# Patient Record
Sex: FEMALE | Race: WHITE | Employment: FULL TIME | URBAN - METROPOLITAN AREA
[De-identification: names, ages, dates, MRNs, and addresses within clinical notes are randomized per-mention and may not be internally consistent; named-entity substitution may affect disease eponyms.]

---

## 2018-06-03 ENCOUNTER — APPOINTMENT (OUTPATIENT)
Dept: GENERAL RADIOLOGY | Age: 38
End: 2018-06-03
Payer: COMMERCIAL

## 2018-06-03 ENCOUNTER — HOSPITAL ENCOUNTER (EMERGENCY)
Age: 38
Discharge: HOME | End: 2018-06-03
Attending: EMERGENCY MEDICINE
Payer: COMMERCIAL

## 2018-06-03 VITALS
RESPIRATION RATE: 18 BRPM | OXYGEN SATURATION: 98 % | DIASTOLIC BLOOD PRESSURE: 70 MMHG | TEMPERATURE: 97.8 F | HEART RATE: 83 BPM | SYSTOLIC BLOOD PRESSURE: 134 MMHG | WEIGHT: 270 LBS | HEIGHT: 70 IN | BODY MASS INDEX: 38.65 KG/M2

## 2018-06-03 DIAGNOSIS — K52.9 GASTROENTERITIS: Primary | ICD-10-CM

## 2018-06-03 DIAGNOSIS — R10.84 GENERALIZED ABDOMINAL PAIN: ICD-10-CM

## 2018-06-03 LAB
ANION GAP SERPL CALCULATED.3IONS-SCNC: 12 MMOL/L (ref 7–16)
BACTERIA: ABNORMAL /HPF
BILIRUBIN URINE: ABNORMAL
BLOOD, URINE: ABNORMAL
BUN BLDV-MCNC: 8 MG/DL (ref 6–20)
CALCIUM SERPL-MCNC: 9 MG/DL (ref 8.6–10.2)
CHLORIDE BLD-SCNC: 100 MMOL/L (ref 98–107)
CHP ED QC CHECK: NORMAL
CLARITY: CLEAR
CO2: 23 MMOL/L (ref 22–29)
COLOR: YELLOW
CREAT SERPL-MCNC: 0.6 MG/DL (ref 0.5–1)
EPITHELIAL CELLS, UA: ABNORMAL /HPF
GFR AFRICAN AMERICAN: >60
GFR NON-AFRICAN AMERICAN: >60 ML/MIN/1.73
GLUCOSE BLD-MCNC: 114 MG/DL (ref 74–109)
GLUCOSE URINE: NEGATIVE MG/DL
HCT VFR BLD CALC: 42.9 % (ref 34–48)
HEMOGLOBIN: 14.4 G/DL (ref 11.5–15.5)
KETONES, URINE: NEGATIVE MG/DL
LEUKOCYTE ESTERASE, URINE: NEGATIVE
MAGNESIUM: 2.3 MG/DL (ref 1.6–2.6)
MCH RBC QN AUTO: 30.5 PG (ref 26–35)
MCHC RBC AUTO-ENTMCNC: 33.6 % (ref 32–34.5)
MCV RBC AUTO: 90.9 FL (ref 80–99.9)
NITRITE, URINE: NEGATIVE
PDW BLD-RTO: 12.9 FL (ref 11.5–15)
PH UA: 5.5 (ref 5–9)
PLATELET # BLD: 282 E9/L (ref 130–450)
PMV BLD AUTO: 10.4 FL (ref 7–12)
POTASSIUM SERPL-SCNC: 6 MMOL/L (ref 3.5–5)
PREGNANCY TEST URINE, POC: NEGATIVE
PROTEIN UA: ABNORMAL MG/DL
RBC # BLD: 4.72 E12/L (ref 3.5–5.5)
RBC UA: ABNORMAL /HPF (ref 0–2)
SODIUM BLD-SCNC: 135 MMOL/L (ref 132–146)
SPECIFIC GRAVITY UA: 1.01 (ref 1–1.03)
UROBILINOGEN, URINE: 0.2 E.U./DL
WBC # BLD: 4.3 E9/L (ref 4.5–11.5)
WBC UA: ABNORMAL /HPF (ref 0–5)

## 2018-06-03 PROCEDURE — 96374 THER/PROPH/DIAG INJ IV PUSH: CPT

## 2018-06-03 PROCEDURE — 2580000003 HC RX 258: Performed by: EMERGENCY MEDICINE

## 2018-06-03 PROCEDURE — 96375 TX/PRO/DX INJ NEW DRUG ADDON: CPT

## 2018-06-03 PROCEDURE — 85027 COMPLETE CBC AUTOMATED: CPT

## 2018-06-03 PROCEDURE — 87088 URINE BACTERIA CULTURE: CPT

## 2018-06-03 PROCEDURE — 80048 BASIC METABOLIC PNL TOTAL CA: CPT

## 2018-06-03 PROCEDURE — 6360000002 HC RX W HCPCS: Performed by: EMERGENCY MEDICINE

## 2018-06-03 PROCEDURE — 81001 URINALYSIS AUTO W/SCOPE: CPT

## 2018-06-03 PROCEDURE — 6370000000 HC RX 637 (ALT 250 FOR IP): Performed by: EMERGENCY MEDICINE

## 2018-06-03 PROCEDURE — 71046 X-RAY EXAM CHEST 2 VIEWS: CPT

## 2018-06-03 PROCEDURE — 99284 EMERGENCY DEPT VISIT MOD MDM: CPT

## 2018-06-03 PROCEDURE — 83735 ASSAY OF MAGNESIUM: CPT

## 2018-06-03 RX ORDER — ACETAMINOPHEN 325 MG/1
650 TABLET ORAL ONCE
Status: COMPLETED | OUTPATIENT
Start: 2018-06-03 | End: 2018-06-03

## 2018-06-03 RX ORDER — ONDANSETRON 4 MG/1
4 TABLET, FILM COATED ORAL EVERY 8 HOURS PRN
Qty: 12 TABLET | Refills: 0 | Status: SHIPPED | OUTPATIENT
Start: 2018-06-03 | End: 2018-06-08

## 2018-06-03 RX ORDER — DICYCLOMINE HYDROCHLORIDE 10 MG/1
20 CAPSULE ORAL
Qty: 15 CAPSULE | Refills: 0 | Status: SHIPPED | OUTPATIENT
Start: 2018-06-03 | End: 2019-08-25

## 2018-06-03 RX ORDER — FENTANYL CITRATE 50 UG/ML
50 INJECTION, SOLUTION INTRAMUSCULAR; INTRAVENOUS ONCE
Status: COMPLETED | OUTPATIENT
Start: 2018-06-03 | End: 2018-06-03

## 2018-06-03 RX ORDER — 0.9 % SODIUM CHLORIDE 0.9 %
2000 INTRAVENOUS SOLUTION INTRAVENOUS ONCE
Status: COMPLETED | OUTPATIENT
Start: 2018-06-03 | End: 2018-06-03

## 2018-06-03 RX ORDER — ONDANSETRON 2 MG/ML
4 INJECTION INTRAMUSCULAR; INTRAVENOUS ONCE
Status: COMPLETED | OUTPATIENT
Start: 2018-06-03 | End: 2018-06-03

## 2018-06-03 RX ADMIN — SODIUM CHLORIDE 2000 ML: 9 INJECTION, SOLUTION INTRAVENOUS at 14:15

## 2018-06-03 RX ADMIN — ACETAMINOPHEN 650 MG: 325 TABLET ORAL at 15:32

## 2018-06-03 RX ADMIN — FENTANYL CITRATE 50 MCG: 50 INJECTION, SOLUTION INTRAMUSCULAR; INTRAVENOUS at 16:20

## 2018-06-03 RX ADMIN — ONDANSETRON 4 MG: 2 INJECTION INTRAMUSCULAR; INTRAVENOUS at 14:15

## 2018-06-03 ASSESSMENT — PAIN DESCRIPTION - PAIN TYPE
TYPE: ACUTE PAIN

## 2018-06-03 ASSESSMENT — PAIN SCALES - GENERAL
PAINLEVEL_OUTOF10: 2
PAINLEVEL_OUTOF10: 8
PAINLEVEL_OUTOF10: 5
PAINLEVEL_OUTOF10: 8
PAINLEVEL_OUTOF10: 8

## 2018-06-03 ASSESSMENT — PAIN DESCRIPTION - DESCRIPTORS
DESCRIPTORS: ACHING;DISCOMFORT
DESCRIPTORS: ACHING
DESCRIPTORS: DISCOMFORT

## 2018-06-03 ASSESSMENT — PAIN SCALES - WONG BAKER
WONGBAKER_NUMERICALRESPONSE: 2
WONGBAKER_NUMERICALRESPONSE: 2
WONGBAKER_NUMERICALRESPONSE: 8

## 2018-06-03 ASSESSMENT — PAIN DESCRIPTION - ORIENTATION: ORIENTATION: RIGHT

## 2018-06-03 ASSESSMENT — PAIN DESCRIPTION - LOCATION
LOCATION: ABDOMEN
LOCATION: SHOULDER
LOCATION: ABDOMEN

## 2018-06-05 LAB — URINE CULTURE, ROUTINE: NORMAL

## 2019-08-25 ENCOUNTER — HOSPITAL ENCOUNTER (EMERGENCY)
Age: 39
Discharge: HOME | End: 2019-08-25
Attending: EMERGENCY MEDICINE
Payer: COMMERCIAL

## 2019-08-25 ENCOUNTER — APPOINTMENT (OUTPATIENT)
Dept: ULTRASOUND IMAGING | Age: 39
End: 2019-08-25
Payer: COMMERCIAL

## 2019-08-25 VITALS
HEIGHT: 70 IN | SYSTOLIC BLOOD PRESSURE: 133 MMHG | WEIGHT: 260 LBS | TEMPERATURE: 98.3 F | OXYGEN SATURATION: 98 % | DIASTOLIC BLOOD PRESSURE: 77 MMHG | BODY MASS INDEX: 37.22 KG/M2 | HEART RATE: 80 BPM | RESPIRATION RATE: 16 BRPM

## 2019-08-25 DIAGNOSIS — K82.8 GALLBLADDER SLUDGE: ICD-10-CM

## 2019-08-25 DIAGNOSIS — R10.13 EPIGASTRIC PAIN: Primary | ICD-10-CM

## 2019-08-25 LAB
ALBUMIN SERPL-MCNC: 4.1 G/DL (ref 3.5–5.2)
ALP BLD-CCNC: 97 U/L (ref 35–104)
ALT SERPL-CCNC: 9 U/L (ref 0–32)
ANION GAP SERPL CALCULATED.3IONS-SCNC: 13 MMOL/L (ref 7–16)
AST SERPL-CCNC: 14 U/L (ref 0–31)
BACTERIA: ABNORMAL /HPF
BASOPHILS ABSOLUTE: 0.02 E9/L (ref 0–0.2)
BASOPHILS RELATIVE PERCENT: 0.3 % (ref 0–2)
BILIRUB SERPL-MCNC: <0.2 MG/DL (ref 0–1.2)
BILIRUBIN DIRECT: <0.2 MG/DL (ref 0–0.3)
BILIRUBIN URINE: NEGATIVE
BILIRUBIN, INDIRECT: NORMAL MG/DL (ref 0–1)
BLOOD, URINE: ABNORMAL
BUN BLDV-MCNC: 8 MG/DL (ref 6–20)
CALCIUM SERPL-MCNC: 9.1 MG/DL (ref 8.6–10.2)
CHLORIDE BLD-SCNC: 102 MMOL/L (ref 98–107)
CLARITY: CLEAR
CO2: 26 MMOL/L (ref 22–29)
COLOR: YELLOW
CREAT SERPL-MCNC: 0.6 MG/DL (ref 0.5–1)
EOSINOPHILS ABSOLUTE: 0.05 E9/L (ref 0.05–0.5)
EOSINOPHILS RELATIVE PERCENT: 0.8 % (ref 0–6)
EPITHELIAL CELLS, UA: ABNORMAL /HPF
GFR AFRICAN AMERICAN: >60
GFR NON-AFRICAN AMERICAN: >60 ML/MIN/1.73
GLUCOSE BLD-MCNC: 106 MG/DL (ref 74–99)
GLUCOSE URINE: NEGATIVE MG/DL
HCG, URINE, POC: NEGATIVE
HCT VFR BLD CALC: 42.7 % (ref 34–48)
HEMOGLOBIN: 14 G/DL (ref 11.5–15.5)
IMMATURE GRANULOCYTES #: 0.02 E9/L
IMMATURE GRANULOCYTES %: 0.3 % (ref 0–5)
KETONES, URINE: NEGATIVE MG/DL
LACTIC ACID: 0.8 MMOL/L (ref 0.5–2.2)
LEUKOCYTE ESTERASE, URINE: NEGATIVE
LIPASE: 14 U/L (ref 13–60)
LYMPHOCYTES ABSOLUTE: 1.18 E9/L (ref 1.5–4)
LYMPHOCYTES RELATIVE PERCENT: 18.3 % (ref 20–42)
Lab: NORMAL
MCH RBC QN AUTO: 30.2 PG (ref 26–35)
MCHC RBC AUTO-ENTMCNC: 32.8 % (ref 32–34.5)
MCV RBC AUTO: 92.2 FL (ref 80–99.9)
MONOCYTES ABSOLUTE: 0.59 E9/L (ref 0.1–0.95)
MONOCYTES RELATIVE PERCENT: 9.1 % (ref 2–12)
NEGATIVE QC PASS/FAIL: NORMAL
NEUTROPHILS ABSOLUTE: 4.59 E9/L (ref 1.8–7.3)
NEUTROPHILS RELATIVE PERCENT: 71.2 % (ref 43–80)
NITRITE, URINE: NEGATIVE
PDW BLD-RTO: 13.3 FL (ref 11.5–15)
PH UA: 6 (ref 5–9)
PLATELET # BLD: 296 E9/L (ref 130–450)
PMV BLD AUTO: 10.4 FL (ref 7–12)
POSITIVE QC PASS/FAIL: NORMAL
POTASSIUM REFLEX MAGNESIUM: 4 MMOL/L (ref 3.5–5)
PROTEIN UA: NEGATIVE MG/DL
RBC # BLD: 4.63 E12/L (ref 3.5–5.5)
RBC UA: ABNORMAL /HPF (ref 0–2)
SODIUM BLD-SCNC: 141 MMOL/L (ref 132–146)
SPECIFIC GRAVITY UA: 1.01 (ref 1–1.03)
TOTAL PROTEIN: 7.8 G/DL (ref 6.4–8.3)
UROBILINOGEN, URINE: 0.2 E.U./DL
WBC # BLD: 6.5 E9/L (ref 4.5–11.5)
WBC UA: ABNORMAL /HPF (ref 0–5)

## 2019-08-25 PROCEDURE — 96374 THER/PROPH/DIAG INJ IV PUSH: CPT

## 2019-08-25 PROCEDURE — 6360000002 HC RX W HCPCS: Performed by: EMERGENCY MEDICINE

## 2019-08-25 PROCEDURE — 96361 HYDRATE IV INFUSION ADD-ON: CPT

## 2019-08-25 PROCEDURE — 83690 ASSAY OF LIPASE: CPT

## 2019-08-25 PROCEDURE — 6370000000 HC RX 637 (ALT 250 FOR IP): Performed by: EMERGENCY MEDICINE

## 2019-08-25 PROCEDURE — 76705 ECHO EXAM OF ABDOMEN: CPT

## 2019-08-25 PROCEDURE — 96375 TX/PRO/DX INJ NEW DRUG ADDON: CPT

## 2019-08-25 PROCEDURE — 85025 COMPLETE CBC W/AUTO DIFF WBC: CPT

## 2019-08-25 PROCEDURE — 99284 EMERGENCY DEPT VISIT MOD MDM: CPT

## 2019-08-25 PROCEDURE — 2580000003 HC RX 258: Performed by: EMERGENCY MEDICINE

## 2019-08-25 PROCEDURE — 2500000003 HC RX 250 WO HCPCS: Performed by: EMERGENCY MEDICINE

## 2019-08-25 PROCEDURE — 80048 BASIC METABOLIC PNL TOTAL CA: CPT

## 2019-08-25 PROCEDURE — 83605 ASSAY OF LACTIC ACID: CPT

## 2019-08-25 PROCEDURE — 80076 HEPATIC FUNCTION PANEL: CPT

## 2019-08-25 PROCEDURE — 81001 URINALYSIS AUTO W/SCOPE: CPT

## 2019-08-25 RX ORDER — OMEPRAZOLE 20 MG/1
1 CAPSULE, DELAYED RELEASE ORAL DAILY
Refills: 4 | COMMUNITY
Start: 2019-08-07 | End: 2019-08-25

## 2019-08-25 RX ORDER — SUCRALFATE 1 G/1
1 TABLET ORAL 4 TIMES DAILY
Qty: 120 TABLET | Refills: 3 | Status: SHIPPED | OUTPATIENT
Start: 2019-08-25 | End: 2020-02-20

## 2019-08-25 RX ORDER — ONDANSETRON 4 MG/1
4 TABLET, ORALLY DISINTEGRATING ORAL EVERY 8 HOURS PRN
Qty: 24 TABLET | Refills: 0 | Status: SHIPPED | OUTPATIENT
Start: 2019-08-25 | End: 2021-05-24

## 2019-08-25 RX ORDER — ONDANSETRON 2 MG/ML
4 INJECTION INTRAMUSCULAR; INTRAVENOUS ONCE
Status: COMPLETED | OUTPATIENT
Start: 2019-08-25 | End: 2019-08-25

## 2019-08-25 RX ORDER — DULOXETIN HYDROCHLORIDE 60 MG/1
1 CAPSULE, DELAYED RELEASE ORAL DAILY
Refills: 4 | COMMUNITY
Start: 2019-08-07

## 2019-08-25 RX ORDER — RIZATRIPTAN BENZOATE 10 MG/1
10 TABLET ORAL PRN
COMMUNITY
End: 2020-02-20

## 2019-08-25 RX ORDER — PANTOPRAZOLE SODIUM 40 MG/1
40 TABLET, DELAYED RELEASE ORAL DAILY
Qty: 30 TABLET | Refills: 1 | Status: SHIPPED | OUTPATIENT
Start: 2019-08-25 | End: 2020-02-20

## 2019-08-25 RX ORDER — 0.9 % SODIUM CHLORIDE 0.9 %
1000 INTRAVENOUS SOLUTION INTRAVENOUS ONCE
Status: COMPLETED | OUTPATIENT
Start: 2019-08-25 | End: 2019-08-25

## 2019-08-25 RX ADMIN — ONDANSETRON 4 MG: 2 INJECTION INTRAMUSCULAR; INTRAVENOUS at 07:05

## 2019-08-25 RX ADMIN — SODIUM CHLORIDE 1000 ML: 9 INJECTION, SOLUTION INTRAVENOUS at 07:05

## 2019-08-25 RX ADMIN — LIDOCAINE HYDROCHLORIDE: 20 SOLUTION ORAL; TOPICAL at 07:05

## 2019-08-25 RX ADMIN — FAMOTIDINE 20 MG: 10 INJECTION, SOLUTION INTRAVENOUS at 07:05

## 2019-08-25 ASSESSMENT — ENCOUNTER SYMPTOMS
WHEEZING: 0
CONSTIPATION: 0
RHINORRHEA: 0
COUGH: 0
HEMATEMESIS: 0
EYE PAIN: 0
DIARRHEA: 1
ABDOMINAL PAIN: 1
BACK PAIN: 0
SINUS PRESSURE: 0
BLOOD IN STOOL: 0
EYE DISCHARGE: 0
EYE REDNESS: 0
SORE THROAT: 0
SHORTNESS OF BREATH: 0
HEMATOCHEZIA: 0
ABDOMINAL DISTENTION: 0
VOMITING: 0
NAUSEA: 0

## 2019-08-25 ASSESSMENT — PAIN DESCRIPTION - LOCATION: LOCATION: ABDOMEN

## 2019-08-25 ASSESSMENT — PAIN SCALES - GENERAL: PAINLEVEL_OUTOF10: 8

## 2019-08-25 ASSESSMENT — PAIN DESCRIPTION - PAIN TYPE: TYPE: ACUTE PAIN

## 2019-08-25 NOTE — ED PROVIDER NOTES
dysuria, flank pain, frequency, hematuria, vaginal bleeding and vaginal discharge. Musculoskeletal: Negative for arthralgias, back pain, neck pain and neck stiffness. Skin: Negative for rash and wound. Neurological: Negative for syncope, weakness, light-headedness and headaches. Hematological: Negative for adenopathy. All other systems reviewed and are negative. Physical Exam   Constitutional: She is oriented to person, place, and time. She appears well-developed and well-nourished. HENT:   Head: Normocephalic and atraumatic. Head is without raccoon's eyes and without Estes's sign. Nose: Nose normal.   Mouth/Throat: Uvula is midline, oropharynx is clear and moist and mucous membranes are normal.   Eyes: Pupils are equal, round, and reactive to light. Conjunctivae, EOM and lids are normal.   Neck: Trachea normal and normal range of motion. Neck supple. No JVD present. Cardiovascular: Normal rate and regular rhythm. Pulmonary/Chest: Effort normal and breath sounds normal. No respiratory distress. She has no wheezes. She has no rales. Abdominal: Soft. Bowel sounds are normal. There is tenderness in the epigastric area. There is no rebound, no guarding and no CVA tenderness. Musculoskeletal: She exhibits no edema. Neurological: She is alert and oriented to person, place, and time. She has normal strength. No cranial nerve deficit or sensory deficit. Skin: Skin is warm and dry. Nursing note and vitals reviewed. Procedures     MDM  Number of Diagnoses or Management Options  Diagnosis management comments: Patient has epigastric pain and tenderness to palpation. Her symptoms improved in ED with IV fluids and medications. Her RUQ US is remarkable for gallbladder sludge. She is discharged in stable condition with instructions to follow-up with her PCP or return to ED if her symptoms worsen.                       --------------------------------------------- PAST HISTORY mg/dL    GFR Non-African American >60 >=60 mL/min/1.73    GFR African American >60     Calcium 9.1 8.6 - 10.2 mg/dL   Hepatic Function Panel   Result Value Ref Range    Total Protein 7.8 6.4 - 8.3 g/dL    Alb 4.1 3.5 - 5.2 g/dL    Alkaline Phosphatase 97 35 - 104 U/L    ALT 9 0 - 32 U/L    AST 14 0 - 31 U/L    Total Bilirubin <0.2 0.0 - 1.2 mg/dL    Bilirubin, Direct <0.2 0.0 - 0.3 mg/dL    Bilirubin, Indirect see below 0.0 - 1.0 mg/dL   Lipase   Result Value Ref Range    Lipase 14 13 - 60 U/L   Lactic Acid, Plasma   Result Value Ref Range    Lactic Acid 0.8 0.5 - 2.2 mmol/L   Urinalysis, reflex to microscopic   Result Value Ref Range    Color, UA Yellow Straw/Yellow    Clarity, UA Clear Clear    Glucose, Ur Negative Negative mg/dL    Bilirubin Urine Negative Negative    Ketones, Urine Negative Negative mg/dL    Specific Gravity, UA 1.010 1.005 - 1.030    Blood, Urine MODERATE (A) Negative    pH, UA 6.0 5.0 - 9.0    Protein, UA Negative Negative mg/dL    Urobilinogen, Urine 0.2 <2.0 E.U./dL    Nitrite, Urine Negative Negative    Leukocyte Esterase, Urine Negative Negative   Microscopic Urinalysis   Result Value Ref Range    WBC, UA 0-1 0 - 5 /HPF    RBC, UA 0-1 0 - 2 /HPF    Epi Cells FEW /HPF    Bacteria, UA FEW (A) /HPF   POC Pregnancy Urine   Result Value Ref Range    HCG, Urine, POC Negative Negative    Lot Number [id number]     Positive QC Pass/Fail Pass     Negative QC Pass/Fail Pass        Radiology:  US GALLBLADDER RUQ   Final Result   Gallbladder sludging without discrete cholelithiasis.          ------------------------- NURSING NOTES AND VITALS REVIEWED ---------------------------  Date / Time Roomed:  8/25/2019  6:31 AM  ED Bed Assignment:  17/17    The nursing notes within the ED encounter and vital signs as below have been reviewed.    /77   Pulse 80   Temp 98.3 °F (36.8 °C) (Oral)   Resp 16   Ht 5' 10\" (1.778 m)   Wt 260 lb (117.9 kg)   LMP 08/18/2019   SpO2 98%   BMI 37.31 kg/m²   Oxygen Saturation Interpretation: Normal      ------------------------------------------ PROGRESS NOTES ------------------------------------------  8:10 AM  Patient is not in distress. Her symptoms have improved. 9:31 AM  I have spoken with the patient and discussed todays results, in addition to providing specific details for the plan of care and counseling regarding the diagnosis and prognosis. Their questions are answered at this time and they are agreeable with the plan. I discussed at length with them reasons for immediate return here for re evaluation. They will followup with their primary care physician by calling their office tomorrow. --------------------------------- ADDITIONAL PROVIDER NOTES ---------------------------------  At this time the patient is without objective evidence of an acute process requiring hospitalization or inpatient management. They have remained hemodynamically stable throughout their entire ED visit and are stable for discharge with outpatient follow-up. The plan has been discussed in detail and they are aware of the specific conditions for emergent return, as well as the importance of follow-up. New Prescriptions    ONDANSETRON (ZOFRAN ODT) 4 MG DISINTEGRATING TABLET    Take 1 tablet by mouth every 8 hours as needed for Nausea or Vomiting    PANTOPRAZOLE (PROTONIX) 40 MG TABLET    Take 1 tablet by mouth daily    SUCRALFATE (CARAFATE) 1 GM TABLET    Take 1 tablet by mouth 4 times daily       Diagnosis:  1. Epigastric pain    2. Gallbladder sludge        Disposition:  Patient's disposition: Discharge to home  Patient's condition is stable.          Jay Torres,   Resident  08/25/19 1008

## 2019-10-29 ENCOUNTER — APPOINTMENT (OUTPATIENT)
Dept: GENERAL RADIOLOGY | Age: 39
End: 2019-10-29
Payer: COMMERCIAL

## 2019-10-29 ENCOUNTER — HOSPITAL ENCOUNTER (EMERGENCY)
Age: 39
Discharge: HOME | End: 2019-10-29
Payer: COMMERCIAL

## 2019-10-29 VITALS
RESPIRATION RATE: 16 BRPM | TEMPERATURE: 98.7 F | OXYGEN SATURATION: 98 % | DIASTOLIC BLOOD PRESSURE: 91 MMHG | SYSTOLIC BLOOD PRESSURE: 161 MMHG | HEART RATE: 86 BPM

## 2019-10-29 DIAGNOSIS — S63.92XA HAND SPRAIN, LEFT, INITIAL ENCOUNTER: Primary | ICD-10-CM

## 2019-10-29 DIAGNOSIS — S80.02XA CONTUSION OF LEFT KNEE, INITIAL ENCOUNTER: ICD-10-CM

## 2019-10-29 PROCEDURE — 73562 X-RAY EXAM OF KNEE 3: CPT

## 2019-10-29 PROCEDURE — 73130 X-RAY EXAM OF HAND: CPT

## 2019-10-29 PROCEDURE — 99284 EMERGENCY DEPT VISIT MOD MDM: CPT

## 2019-10-29 RX ORDER — GINSENG 100 MG
CAPSULE ORAL ONCE
Status: DISCONTINUED | OUTPATIENT
Start: 2019-10-29 | End: 2019-10-29 | Stop reason: HOSPADM

## 2019-10-29 ASSESSMENT — ENCOUNTER SYMPTOMS
COLOR CHANGE: 0
COUGH: 0
SHORTNESS OF BREATH: 0
BACK PAIN: 0
CHEST TIGHTNESS: 0

## 2019-10-29 ASSESSMENT — PAIN DESCRIPTION - LOCATION: LOCATION: HAND;KNEE

## 2019-10-29 ASSESSMENT — PAIN DESCRIPTION - PAIN TYPE: TYPE: ACUTE PAIN

## 2019-10-29 ASSESSMENT — PAIN DESCRIPTION - ORIENTATION: ORIENTATION: LEFT

## 2019-10-29 ASSESSMENT — PAIN SCALES - GENERAL: PAINLEVEL_OUTOF10: 7

## 2021-05-28 ENCOUNTER — HOSPITAL ENCOUNTER (OUTPATIENT)
Dept: GENERAL RADIOLOGY | Age: 41
Discharge: HOME | End: 2021-05-30
Payer: COMMERCIAL

## 2021-05-28 DIAGNOSIS — N63.25 BREAST LUMP ON LEFT SIDE AT 9 O'CLOCK POSITION: ICD-10-CM

## 2021-05-28 PROCEDURE — G0279 TOMOSYNTHESIS, MAMMO: HCPCS

## 2021-05-28 PROCEDURE — 76642 ULTRASOUND BREAST LIMITED: CPT

## 2021-09-09 DIAGNOSIS — N63.0 BREAST LUMP: Primary | ICD-10-CM

## 2021-09-15 NOTE — PROGRESS NOTES
Subjective:      Patient ID: Shabana Laughlin is a 46 y.o. female. HPI  History and Physical    Patient's Name/Date of Birth: Shabana Laughlin / 1980    Date: 2021    Shabana Laughlin presents for evaluation of a Left breast lesion and evaluation of high risk status. PCP: Vitaliy Oliva MD. Daya Feliciano. The lesion is located in the 9 o'clock position of the Left breast. The lesion was discovered clinical exam.  The patient has noted a subtle change in BSE. She will have occasional nipple crusting bilaterally, most notable on the left, since breastfeeding her child 4 years ago. -Paternal grandmother with breast cancer age 54 and ovarian cancer;  age 61  -Paternal grandmother with colon cancer at age 39  -Maternal grandfather with colon cancer age 48    Ashkenazi Gnosticism Ancestry: No.    OBSTETRIC RELATED HISTORY:  Age of menarche was 8. Patient is . Age of first live birth was 34. Patient did breast feed. Is patient interested in fertility information about fertility preservation? No    CANCER SURVEILLANCE HISTORY:  Mammograms: Yes   Breast MRI's: No   Breast Biopsies: No   Colonoscopy: No   GI Polyps: No   EGD: No   Pelvic Exam: Yes   Pap Smear: Yes   Dermatology: No   Lung screening: no    Estimated body mass index is 36.01 kg/m² as calculated from the following:    Height as of 21: 5' 10\" (1.778 m). Weight as of 21: 251 lb (113.9 kg). Bra Size: 40 C    Because violence is so common, we ask all our patients: are you in a relationship or do you live with a person who threatens, hurts, or controls you:  Denies    Patient drinks significant caffeinated beverages. Patient does not smoke cigarettes. Patient does not use recreational drugs.       Past Medical History:   Diagnosis Date    Endometriosis        Past Surgical History:   Procedure Laterality Date     SECTION      ENDOMETRIAL ABLATION  10/2015    THROAT SURGERY      in fourth grade       Current Outpatient Medications   Medication Sig Dispense Refill    etonogestrel-ethinyl estradiol (NUVARING) 0.12-0.015 MG/24HR vaginal ring Place 1 each vaginally every 21 days Insert one (1) ring vaginally and leave in place for three (3) weeks, then remove for one (1) week. 1 each 12    omeprazole (PRILOSEC) 10 MG delayed release capsule Take 10 mg by mouth daily      clotrimazole-betamethasone (LOTRISONE) 1-0.05 % cream Apply topically 2 times daily. (Patient not taking: Reported on 9/8/2021) 45 g 2    DULoxetine (CYMBALTA) 60 MG extended release capsule Take 1 capsule by mouth daily  4     No current facility-administered medications for this visit. No Known Allergies    Family History   Problem Relation Age of Onset    Other Mother         IBS    High Blood Pressure Father     Other Maternal Grandmother         Non Hodgkins lymphoma    Colon Cancer Maternal Grandfather     Breast Cancer Paternal Grandmother     Colon Cancer Paternal Grandmother     Uterine Cancer Paternal Grandmother     Ovarian Cancer Paternal Grandmother        Social History     Socioeconomic History    Marital status:      Spouse name: Not on file    Number of children: Not on file    Years of education: Not on file    Highest education level: Not on file   Occupational History    Not on file   Tobacco Use    Smoking status: Never Smoker    Smokeless tobacco: Never Used   Substance and Sexual Activity    Alcohol use: No    Drug use: No    Sexual activity: Not Currently     Partners: Male   Other Topics Concern    Not on file   Social History Narrative    Not on file     Social Determinants of Health     Financial Resource Strain:     Difficulty of Paying Living Expenses:    Food Insecurity:     Worried About Running Out of Food in the Last Year:     Ran Out of Food in the Last Year:    Transportation Needs:     Lack of Transportation (Medical):      Lack of Transportation (Non-Medical): Physical Activity:     Days of Exercise per Week:     Minutes of Exercise per Session:    Stress:     Feeling of Stress :    Social Connections:     Frequency of Communication with Friends and Family:     Frequency of Social Gatherings with Friends and Family:     Attends Christian Services:     Active Member of Clubs or Organizations:     Attends Club or Organization Meetings:     Marital Status:    Intimate Partner Violence:     Fear of Current or Ex-Partner:     Emotionally Abused:     Physically Abused:     Sexually Abused:        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 Review of Systems   Constitutional: Negative for activity change, appetite change, chills, fatigue, fever and unexpected weight change. She reports she generally feels well. HENT: Negative for congestion, postnasal drip, rhinorrhea, sinus pressure, sinus pain, sore throat, trouble swallowing and voice change. Eyes: Negative for discharge, itching and visual disturbance. Respiratory: Negative for cough, choking, chest tightness, shortness of breath and wheezing. Cardiovascular: Negative for chest pain, palpitations and leg swelling. Gastrointestinal: Negative for abdominal distention, abdominal pain, blood in stool, constipation, diarrhea, nausea and vomiting. Endocrine: Negative for cold intolerance and heat intolerance. Genitourinary: Negative for difficulty urinating, dysuria, frequency and hematuria. Musculoskeletal: Negative for arthralgias, back pain, gait problem, joint swelling, myalgias, neck pain and neck stiffness. Allergic/Immunologic: Negative for environmental allergies and food allergies. Neurological: Negative for dizziness, seizures, syncope, speech difficulty, weakness, light-headedness and headaches. Hematological: Negative for adenopathy. Does not bruise/bleed easily. Psychiatric/Behavioral: Negative for agitation, confusion and decreased concentration.  The patient is not nervous/anxious. Objective:   Physical Exam  Vitals and nursing note reviewed. Constitutional:       General: She is not in acute distress. Appearance: She is well-developed. She is not diaphoretic. Comments: ECOG 0; Pleasant and conversant. Busy with her son who is 3years old and 15year old daughter. HENT:      Head: Normocephalic and atraumatic. Mouth/Throat:      Pharynx: No oropharyngeal exudate. Eyes:      General: No scleral icterus. Right eye: No discharge. Left eye: No discharge. Conjunctiva/sclera: Conjunctivae normal.   Neck:      Thyroid: No thyromegaly. Vascular: No JVD. Trachea: No tracheal deviation. Cardiovascular:      Rate and Rhythm: Normal rate and regular rhythm. Heart sounds: No murmur heard. No friction rub. No gallop. Pulmonary:      Effort: Pulmonary effort is normal. No respiratory distress or retractions. Breath sounds: Normal breath sounds. No stridor. No wheezing or rales. Chest:      Chest wall: No mass, lacerations, deformity, swelling, tenderness or edema. Breasts: Breasts are asymmetrical (Left breast slightly smaller and higher sitting on the chest wall compared to right.). Right: No inverted nipple, mass, nipple discharge, skin change or tenderness. Left: No inverted nipple, mass, nipple discharge, skin change or tenderness. Comments: Breast tissue is dense bilaterally, left greater than right. There are no clinically suspicious findings; no skin changes. And no evidence of malignancy. No nipple discharge. Abdominal:      General: There is no distension. Palpations: Abdomen is soft. Tenderness: There is no abdominal tenderness. There is no guarding or rebound. Musculoskeletal:         General: No tenderness or deformity. Normal range of motion.       Right shoulder: Normal.      Left shoulder: Normal.      Cervical back: Normal range of motion and neck supple. Lymphadenopathy:      Cervical: No cervical adenopathy. Right cervical: No superficial, deep or posterior cervical adenopathy. Left cervical: No superficial, deep or posterior cervical adenopathy. Upper Body:      Right upper body: No pectoral adenopathy. Left upper body: No pectoral adenopathy. Skin:     General: Skin is warm and dry. Coloration: Skin is not pale. Findings: No erythema or rash. Neurological:      Mental Status: She is alert and oriented to person, place, and time. Coordination: Coordination normal.   Psychiatric:         Behavior: Behavior normal.         Thought Content: Thought content normal.         Judgment: Judgment normal.         Assessment:      46 y.o. woman who risks for breast cancer include gender and family history including:    -Paternal grandmother with breast cancer age and ovarian cancer age 54;  age 61  -Paternal grandmother with colon cancer at age 39  -Maternal grandfather with colon cancer age 48    She presents with concerns for a left breast mass.    -2021: B/L DIAGNOSTIC MAMMOGRAM AND LEFT BREAST ULTRASOUND: Negative, BI-RADS   -2021: LEFT DIAGNOSTIC MAMMOGRAM AND ULTRASOUND:[facility] negative, BI-RADS 1. During pt's visit today, a Tyrer-Cuzick Risk Evaluation was performed. Pt has a 23.1 % lifetime risk (to age 80) of developing breast cancer. Clinically, her breast exam is unremarkable aside from age-appropriate breast density, left greater than right. We reviewed her imaging today, including her BI-RADS result. We discussed that I cannot guarantee that she does not have breast cancer now; nor can I guarantee that she won't develop breast cancer in the future. However, there were no concerning findings identified on this visit. We reviewed healthy lifestyle, avoiding alcohol, and BSE in detail. HIGH RISK PROTOCOL:      During pt's visit today, a Tyrer-Cuzick Risk Evaluation was performed. Pt has a 21.3 % lifetime risk (to age 80) of developing breast cancer (high risk). Per NCCN guidelines Version 1.2019 , the following recommendations were discussed:      Clinical encounter every 6-12 months   [facility]erral to [facility].  Annual screening mammogram    Annual breast MRI: We discussed that annual MRI screening is to begin 10 years prior to youngest family member diagnosed with breast cancer. However, in view of her family history will plan to get a baseline MRI and November. Further recommendations will be made once genetic testing is complete.    -High risk screening:  Discussed MRI of breast and advised her that breast MRI has high sensitivity but the specificity is lower due to the overlap in the enhancement pattern of benign and malignant lesions. This may lead to false positive results and invasive testing and procedures that may be unnecessary (i.e., biopsy, excision, etc.). We reviewed potential risks of Gadolinium and that risks, if any, are not fully understood. Gadolinium is retained for months or years and brain, bone, and other organs. The FDA states there is no clinical evidence that directly links gadolinium retention to adverse effects in patients with normal kidney function. Nephrogenic systemic fibrosis has occurred in patients with impaired elimination of gadolinium based contrast agents. Higher than recommended dosing or repeated dosing appears to increase the risk. We reviewed that it's important to have blood work to ensure adequate kidney function prior to MRI. We discussed prior authorization does not cover the co-pay, and once authorized, she should contact her insurance company to clearly understand the financial impact of testing. Anaphylactic and other hypersensitivity reactions with cardiovascular, respiratory or cutaneous manifestations, ranging from mild to severe, including death, have occurred.   She verbalizes understanding and agrees to

## 2021-09-17 ENCOUNTER — HOSPITAL ENCOUNTER (OUTPATIENT)
Dept: GENERAL RADIOLOGY | Age: 41
Discharge: HOME | End: 2021-09-19
Payer: COMMERCIAL

## 2021-09-17 ENCOUNTER — OFFICE VISIT (OUTPATIENT)
Dept: BREAST CENTER | Age: 41
End: 2021-09-17
Payer: COMMERCIAL

## 2021-09-17 VITALS
BODY MASS INDEX: 36.36 KG/M2 | HEART RATE: 81 BPM | TEMPERATURE: 98.2 F | OXYGEN SATURATION: 98 % | SYSTOLIC BLOOD PRESSURE: 136 MMHG | HEIGHT: 70 IN | WEIGHT: 254 LBS | DIASTOLIC BLOOD PRESSURE: 80 MMHG | RESPIRATION RATE: 18 BRPM

## 2021-09-17 DIAGNOSIS — Z80.3 FAMILY HISTORY OF BREAST CANCER: Primary | ICD-10-CM

## 2021-09-17 DIAGNOSIS — Z01.818 PRE-OP EXAM: ICD-10-CM

## 2021-09-17 DIAGNOSIS — Z80.0 FAMILY HISTORY OF COLON CANCER: ICD-10-CM

## 2021-09-17 DIAGNOSIS — N63.0 BREAST LUMP: ICD-10-CM

## 2021-09-17 DIAGNOSIS — Z80.41 FAMILY HISTORY OF OVARIAN CANCER: ICD-10-CM

## 2021-09-17 DIAGNOSIS — Z80.0 FAMILY HISTORY OF MALIGNANT NEOPLASM OF COLON IN RELATIVE DIAGNOSED WHEN YOUNGER THAN 50 YEARS OF AGE: ICD-10-CM

## 2021-09-17 DIAGNOSIS — Z91.89 AT HIGH RISK FOR BREAST CANCER: ICD-10-CM

## 2021-09-17 PROCEDURE — 99204 OFFICE O/P NEW MOD 45 MIN: CPT | Performed by: NURSE PRACTITIONER

## 2021-09-17 PROCEDURE — 99203 OFFICE O/P NEW LOW 30 MIN: CPT | Performed by: NURSE PRACTITIONER

## 2021-09-17 PROCEDURE — 76642 ULTRASOUND BREAST LIMITED: CPT

## 2021-09-17 PROCEDURE — 77065 DX MAMMO INCL CAD UNI: CPT

## 2021-09-17 ASSESSMENT — ENCOUNTER SYMPTOMS
NAUSEA: 0
TROUBLE SWALLOWING: 0
SORE THROAT: 0
VOMITING: 0
BLOOD IN STOOL: 0
RHINORRHEA: 0
EYE DISCHARGE: 0
VOICE CHANGE: 0
ABDOMINAL PAIN: 0
SINUS PAIN: 0
WHEEZING: 0
ABDOMINAL DISTENTION: 0
CHOKING: 0
DIARRHEA: 0
CONSTIPATION: 0
COUGH: 0
CHEST TIGHTNESS: 0
BACK PAIN: 0
SINUS PRESSURE: 0
SHORTNESS OF BREATH: 0
EYE ITCHING: 0

## 2021-09-17 NOTE — PATIENT INSTRUCTIONS

## 2021-09-17 NOTE — LETTER
[address] [facility] [address] [address]  Phone: [phone]  Fax: 15 Wendiemiranda WrayPAOLA CNP      2021     Patient: Andrea De Santiago   MR Number: [id number]   YOB: 1980   Date of Visit: 2021        Dear Dr. Lisa Herrera:    Thank you for referring Andrea De Santiago to to the office of Dr. Marissa White, Dr. Ayesha Rodriguez, and Nurse Practitioner Leanne House. Below are the relevant portions of my assessment and plan of care. Flaquito Quevedo is a 46 y.o. extremely pleasant woman who risks for breast cancer include gender and family history including:    -Paternal grandmother with breast cancer age and ovarian cancer age 54;  age 61  -Paternal grandmother with colon cancer at age 39  -Maternal grandfather with colon cancer age 48    She presents with concerns for a left breast mass.    -2021: B/L DIAGNOSTIC MAMMOGRAM AND LEFT BREAST ULTRASOUND: Negative, BI-RADS   -2021: LEFT DIAGNOSTIC MAMMOGRAM AND ULTRASOUND:[facility] negative, BI-RADS 1. Clinically, her breast exam is unremarkable aside from age-appropriate breast density, left greater than right. We reviewed her imaging today, including her BI-RADS result. We discussed that I cannot guarantee that she does not have breast cancer now; nor can I guarantee that she won't develop breast cancer in the future. However, there were no concerning findings identified on this visit. We reviewed healthy lifestyle, avoiding alcohol, and BSE in detail. HIGH RISK PROTOCOL:      During pt's visit today, a Tyrer-Cuzick Risk Evaluation was performed. Pt has a 23.1 % lifetime risk (to age 80) of developing breast cancer. Per NCCN guidelines Version 1.2019 , the following recommendations were discussed:      Clinical encounter every 6-12 months   Referral to [facility].    Annual screening mammogram    Annual breast MRI: We discussed that annual MRI screening is to begin 10 years prior to youngest family member diagnosed with breast cancer. However, in view of her family history will plan to get a baseline MRI and November. Further recommendations will be made once genetic testing is complete.    -High risk screening:  Discussed MRI of breast and advised her that breast MRI has high sensitivity but the specificity is lower due to the overlap in the enhancement pattern of benign and malignant lesions. This may lead to false positive results and invasive testing and procedures that may be unnecessary (i.e., biopsy, excision, etc.). We reviewed potential risks of Gadolinium and that risks, if any, are not fully understood. Gadolinium is retained for months or years and brain, bone, and other organs. The FDA states there is no clinical evidence that directly links gadolinium retention to adverse effects in patients with normal kidney function. Nephrogenic systemic fibrosis has occurred in patients with impaired elimination of gadolinium based contrast agents. Higher than recommended dosing or repeated dosing appears to increase the risk. We reviewed that it's important to have blood work to ensure adequate kidney function prior to MRI. We discussed prior authorization does not cover the co-pay, and once authorized, she should contact her insurance company to clearly understand the financial impact of testing. Anaphylactic and other hypersensitivity reactions with cardiovascular, respiratory or cutaneous manifestations, ranging from mild to severe, including death, have occurred. She verbalizes understanding and agrees to proceed. -Genetic Testing:  Referal placed today.   -Chemoprevention:  We discussed once genetic workup complete, she may be eligible for chemoprevention with tamoxifen. -Denies history of DVT/PE  -GYN annually. Follows with Dr. Hilda Jett, [facility].     -View of her family history of colon cancer at age 39, will refer for discussion regarding baseline  `screening colonoscopy. She has no GI complaints on this visit. PLAN:  Continue monthly breast self examination; detailed instructions reviewed today. Bring any changes to your physician's attention. Continue healthy diet and exercise routinely as tolerated to maintain IBW. Avoid alcohol. Limit caffeine intake. Repeat mammogram May 2022 (not ordered). MRI bilateral breast November 2021  Continue follow up with Primary Care and Gynecology. Refer to [facility]. RTC November with MRI bilateral breast prior. This document is generated, in part, by voice recognition software and thus syntax and grammatical errors are possible. If you have questions, please do not hesitate to call me. I look forward to following Gayla along with you. Sincerely,  Patience Screen (Marty Hillman) Regina Cruz RN, MSN, APRN-CNP, [address] [place]  Advanced Oncology Certified Nurse Practitioner  Department of Breast Surgery  [facility]/  Care in collaboration with Dr. Blu Kwon.  Adilia/Dr. Leni Langford, APRN - CNP    CC providers:  Christie Fuchs MD

## 2021-09-24 ENCOUNTER — TELEPHONE (OUTPATIENT)
Dept: BREAST CENTER | Age: 41
End: 2021-09-24

## 2021-09-24 NOTE — TELEPHONE ENCOUNTER
Referral submitted to Mildred Villegas - Genetic Counselor - patient information has been faxed. They will contact patient with an appointment.

## 2021-10-20 ENCOUNTER — OFFICE VISIT (OUTPATIENT)
Dept: SURGERY | Age: 41
End: 2021-10-20
Payer: COMMERCIAL

## 2021-10-20 VITALS
OXYGEN SATURATION: 100 % | HEART RATE: 92 BPM | WEIGHT: 250 LBS | HEIGHT: 70 IN | SYSTOLIC BLOOD PRESSURE: 137 MMHG | RESPIRATION RATE: 14 BRPM | BODY MASS INDEX: 35.79 KG/M2 | TEMPERATURE: 97.3 F | DIASTOLIC BLOOD PRESSURE: 100 MMHG

## 2021-10-20 DIAGNOSIS — K21.9 GASTROESOPHAGEAL REFLUX DISEASE WITHOUT ESOPHAGITIS: Primary | ICD-10-CM

## 2021-10-20 DIAGNOSIS — Z12.11 ENCOUNTER FOR SCREENING COLONOSCOPY: ICD-10-CM

## 2021-10-20 PROCEDURE — 99203 OFFICE O/P NEW LOW 30 MIN: CPT | Performed by: SURGERY

## 2021-10-20 RX ORDER — SODIUM, POTASSIUM,MAG SULFATES 17.5-3.13G
1 SOLUTION, RECONSTITUTED, ORAL ORAL 2 TIMES DAILY
Qty: 2 EACH | Refills: 0 | Status: SHIPPED | OUTPATIENT
Start: 2021-10-20 | End: 2021-10-21

## 2021-10-20 NOTE — PROGRESS NOTES
[facility]  HISTORY & PHYSICAL      CC:  Colorectal cancer screening         HPI:     Vibha Dos Santos is here as New patient(10/20/2021). The patient was sent here to discuss colorectal cancer screening. The patient denies any weight loss, rectal bleeding, abdominal pain, or change in bowel habits. There is no family history of IBD. Strong family hx of colon cancer--both sides of family. Maternal grandpa --early 60s--colon cancer, paternal grandmother early 46s diagnosed w colon cancer The patient has never had a screening colonoscopy. Patient complains of GERD symptoms of prolonged duration. Symptoms have been present for approximately several years. Symptoms includeheartburn. The patient denies no other symptoms. Symptoms appear to be worsened by fatty foods, large meals and tomato sauce. Risk factors present for GERD include none. Risk factors absent for GERD are none. Studies performed so far include none. Treatments tried so far include proton pump inhibitor: omeprazole 10 mg daily. Results of treatment: considerable improvement in symptom frequency. Currently, the symptoms are mildand occur approximately 1 times per month.       Past Medical History:   Diagnosis Date    Endometriosis      Past Surgical History:   Procedure Laterality Date     SECTION   & 2017    ENDOMETRIAL ABLATION  10/2015    THROAT SURGERY      in fourth grade     Social History     Socioeconomic History    Marital status:      Spouse name: Not on file    Number of children: Not on file    Years of education: Not on file    Highest education level: Not on file   Occupational History    Not on file   Tobacco Use    Smoking status: Never Smoker    Smokeless tobacco: Never Used   Substance and Sexual Activity    Alcohol use: Yes     Comment: rare    Drug use: No    Sexual activity: Not Currently     Partners: Male   Other Topics Concern    Not on file   Social History Narrative    Not on file     Social Determinants of Health     Financial Resource Strain:     Difficulty of Paying Living Expenses:    Food Insecurity:     Worried About Running Out of Food in the Last Year:     [address] in the Last Year:    Transportation Needs:     Lack of Transportation (Medical):  Lack of Transportation (Non-Medical):    Physical Activity:     Days of Exercise per Week:     Minutes of Exercise per Session:    Stress:     Feeling of Stress :    Social Connections:     Frequency of Communication with Friends and Family:     Frequency of Social Gatherings with Friends and Family:     Attends Rastafari Services:     Active Member of Clubs or Organizations:     Attends Club or Organization Meetings:     Marital Status:    Intimate Partner Violence:     Fear of Current or Ex-Partner:     Emotionally Abused:     Physically Abused:     Sexually Abused:      No Known Allergies     I have reviewed and confirmed the past medical history, surgical history, social history, allergies in the chart. Medications: I have reviewed the medication list in the chart.     Review of Systems:  Review of Systems - History obtained from the patient  General ROS: negative  Psychological ROS: negative  Ophthalmic ROS: negative for - blurry vision, double vision or loss of vision  ENT ROS: negative for - epistaxis, sore throat, vocal changes or malocclusion  Respiratory ROS: negative for - pleuritic pain, shortness of breath or tachypnea  Cardiovascular ROS: negative for - chest pain or palpitations  Gastrointestinal ROS: negative for - abdominal pain or gas/bloating  Genito-Urinary ROS: negative for - hematuria or pelvic pain  Endocrine ROS: negative   Heme ROS: negative   Musculoskeletal ROS: negative  Neurological ROS: negative for - confusion, dizziness, headaches, numbness/tingling, seizures or weakness    Physical Exam   BP (!) 137/100   Pulse 92   Temp 97.3 °F (36.3 °C) (Infrared)   Resp 14   Ht 5' 10\" (1.778 m)   Wt 250 lb (113.4 kg)   SpO2 100%   BMI 35.87 kg/m²   Vitals:    10/20/21 1446   BP: (!) 137/100   Pulse: 92   Resp: 14   Temp: 97.3 °F (36.3 °C)   SpO2: 100%       PSYCH: mood and affect normal, alert and oriented x 3  CONSTITUTIONAL: No apparent distress, comfortable  EYES: Sclera white, pupils equal round and reactive to light  ENMT:  Hearing normal, trachea midline, ears externally intact  LYMPH: no lympadenopathy in neck. No lympadenopathy in groins  RESP: Breath sounds were clear and equal with no rales, wheezes, or rhonchi. Respiratory effort was normal with no retractions or use of accessory muscles. CV: Heart sounds were normal with a regular rate and rhythm. No pedal edema  GI/ Abdomen: The abdomen was soft and non distended. There was no tenderness, guarding, rebound, or rigidity. There was no                     masses, hepatosplenomegaly, or hernias. Rectal -Deferred  MSK: no clubbing/ no cyanosis/ gait normal       Assessment:    1. Chronic GERD  2. Average risk for colorectal cancer     Plan:  1. Sx controlled with prilosec 10 mg   Will proceed with EGD since pt has had GERD for 4 years    2. Screening colonoscopy  I discussed the options for colorectal cancer screening with the patient including options of fecal occult blood testing with flexible sigmoidoscopy or air contrast barium enema. I also discussed the risks and benefits of colonoscopy with possible biopsy/polypectomy/cauterization. I recommended colonoscopy with deep sedation. The patient understands the risks of bleeding and perforation (<0.1%) and agrees to proceed.   2 days of clear liquids prior to procedure  suprep split dose prep  Schedule at Anthony Segura MD, FACS  10/20/2021  3:16 PM

## 2021-10-20 NOTE — PATIENT INSTRUCTIONS
[address] Surgery  SUPREP  COLON PREP FOR COLONOSCOPY OR COLON SURGERY    It is very important that you follow all of the instructions listed on this sheet carefully (they may be slightly different than the directions on the product that you purchase at the pharmacy) to ensure that your colon is adequately cleaned out or your risk of complications could be increased. 2 Days or More Before Endoscopy:   Obtain SUPREP from the pharmacy.  Do not eat corn, tomatoes, peas or watermelon 3 to 5 days before procedure. · Two days before the colonoscopy, [facility] both [facility] and put in the refrigerator to get cold  · Start clear liquid diet 2 days before the procedure   If you are on INSULIN or OTHER DIABETIC MEDICATIONS, then check with your primary care physician as to how to adjust your medication while on clear liquid diet and when nothing by mouth. 1 Day Before the Endoscopy:   No solid food  only clear liquids (soup, jello, or juice that you can see through with no solid food) for breakfast, lunch and supper. DO NOT drink or eat anything that is red as it will turn the inside of the colon red and look like blood.  Have at least 8 oz or more of clear liquids for breakfast (7 am to 8 am) and lunch (11:30 am to 12:30 pm).  1:00 pm Drink at least 8 oz of clear liquids.  3:00 pm Drink at least 8 oz of clear liquids.  4:00 pm Drink one of the 16 oz bottles of the SUPREP followed immediately by at least 8 oz of clear liquids.  5:00 pm Drink at least 8 oz of clear liquids.  Can continue to take  clear liquids    Day of Endoscopy:   4 hours prior to scheduled time for colonoscopy, drink the second bottle of SUPREP followed immediately by at least 8 oz of clear liquids.   STOP ALL CLEAR LIQUIDS 2 HOURS PRIOR TO SCHEDULED TIME   If any blood pressure medications or heart medications are due in the morning, you should take them with a sip of water.     Instructions for Clear liquid diet  Definition  A clear liquid diet consists of clear liquids, such as water, broth and plain gelatin, that are easily digested and leave no undigested residue in your intestinal tract. Your doctor may prescribe a clear liquid diet before certain medical procedures or if you have certain digestive problems. Because a clear liquid diet can't provide you with adequate calories and nutrients, it shouldn't be continued for more than a few days. Purpose  A clear liquid diet is often used before tests, procedures or surgeries that require no food in your stomach or intestines, such as before colonoscopy. It may also be recommended as a short-term diet if you have certain digestive problems, such as nausea, vomiting or diarrhea, or after certain types of surgery. Diet details  A clear liquid diet helps maintain adequate hydration, provides some important electrolytes, such as sodium and potassium, and gives some energy at a time when a full diet isn't possible or recommended. The following foods are allowed in a clear liquid diet:    Plain water    Fruit juices without pulp, such as apple juice, grape juice or cranberry juice    Strained lemonade or fruit punch    Clear, fat-free broth (bouillon or consomme)    Clear sodas    Plain gelatin    Honey    Ice pops without bits of fruit or fruit pulp    Tea or coffee without milk or cream    Any foods not on the above list should be avoided. Also, for certain tests, such as colon exams, your doctor may ask you to avoid liquids or gelatin with red coloring.      A typical menu on the clear liquid diet may look like this:     Breakfast:  1 glass fruit juice  1 cup coffee or tea (without dairy products)  1 cup broth  1 bowl gelatin     Snack:  1 glass fruit juice  1 bowl gelatin     Lunch:  1 glass fruit juice  1 glass water  1 cup broth  1 bowl gelatin     Snack:  1 ice pop (without fruit pulp)  1 cup coffee or tea (without dairy products) or a soft drink     Dinner:  1 cup juice or water  1 cup broth  1 bowl gelatin  1 cup coffee or tea     Results  Although the clear liquid diet may not be very exciting, it does fulfill its purpose. It's designed to keep your stomach and intestines clear, limit strain to your digestive system, but keep your body hydrated as you prepare for or recover from a medical procedure. Risks  Because a clear liquid diet can't provide you with adequate calories and nutrients, it shouldn't be used for more than a few days. Only use the clear liquid diet as directed by your doctor. If your doctor prescribes a clear liquid diet before a medical test, be sure to follow the diet instructions exactly. If you don't follow the diet exactly, you risk an inaccurate test and may have to reschedule the procedure for another time. The importance of proper hydration  A colonoscopy prep causes the body to lose a significant amount of fluid and can result in sickness due to dehydration. It's important that you prepare your body by drinking extra clear liquids before the prep. Stay hydrated by drinking all required clear liquids during the prep. Replenish your system by drinking clear liquids after returning home from your colonoscopy. Patient Information and Instructions for  Upper GI Endoscopy or Esophagogastroduodenoscopy [EGD])         Definition Upper GI Endoscopy or Esophagogastroduodenoscopy [EGD])  This is a test that uses a fiberoptic scope to examine the esophagus (throat), stomach, and upper part of the small intestines.      Upper GI endoscopy may be recommended if you have:   Abdominal pain   Severe heartburn   Persistent nausea and vomiting   Difficulty swallowing   Blood in stool or vomit   Abnormal x-ray or other examinations of the gastrointestinal tract     Conditions that can be diagnosed with upper GI endoscopy include:   Ulcers   Tumors   Polyps   Abnormal narrowing   Inflammation Possible Complications   Complications are rare, but no procedure is completely free of risk. If you are planning to have upper GI endoscopy, your doctor will review a list of possible complications, which may include:   Bleeding   Damage to the esophagus, stomach, or intestine   Infection   Respiratory depression (reduced breathing rate and/or depth)   Reaction to sedatives or anesthesia causing your blood pressure to drop    Some factors that may increase the risk of complications include:   Age: 61 or older   Pregnancy   Obesity   Smoking , alcoholism , or drug use   Malnutrition   Recent illness   Diabetes   Heart or lung problems   Bleeding disorders   Use of certain medicines     Be sure to discuss these risks with your doctor before the test.     What to Expect   Prior to test   Leading up to the test:   Arrange for a ride home after the test. Also, arrange for help at home. The night before, eat a light meal.  Do not eat or drink anything after midnight the night before the test.   Talk to your doctor about your medicines. You may be asked to stop taking some medicines up to one week before the procedure, like:   Anti-inflammatory drugs (e.g., aspirin )   Blood thinners, like clopidogrel (Plavix) or warfarin (Coumadin)     Description of the Test   You will be asked to lie on your left side. You will have monitors tracking your breathing, heart rate, and blood oxygen levels. You will be given supplemental oxygen to breathe through your nose. A mouthpiece will be positioned to help keep your mouth open. Your throat may be sprayed with a numbing medicine. You will be given a sedative through an IV to help you relax during the test.  During the test, a small suction tube will be used to clear saliva and fluids from your mouth. The endoscope will be lubricated and placed in your mouth. You will be asked to try to swallow it. Then, it will be carefully and slowly advanced down your throat.  It will be passed through your esophagus and into your stomach and intestine. While the endoscope is being advanced, your doctor will view the images on the screen. Air will be passed through the endoscope into your digestive tract. This will be done to smooth the normal folds in the tissues, allowing your doctor to view the tissue more easily. Tiny tools may be passed through the endoscope in order to take biopsies or do other tests. After Test   After the test, you will be observed for an hour. Then, you will be allowed to go home. When you return home after the test, do the following to help ensure a smooth recovery:   Rest when you get home. Ask your doctor if you can resume your normal diet. In most cases, you will be able to. Sedatives can slow your reaction time. Do not drive or use machinery for the rest of the day. Avoid alcohol for the rest of the day. Be sure to follow your doctor's instructions . How Long Will It Take? Usually about 10-15 minutes     Will It Hurt? Most people do not feel anything during the test and will not remember the test.  After the test, your throat may be sore and you may feel bloated. Results   This test gives your doctor information about the health of your digestive system. The results can help to explain your symptoms. You and your doctor will talk about the results and your treatment plan. Call Your Doctor   After the test, call your doctor if any of the following occurs:   Signs of infection, including fever and chills   Severe abdominal pain   Hard, swollen abdomen   Difficulty swallowing or breathing   Any change or increase in your original symptoms   Bloody or black tarry colored stools   Nausea and/or vomiting   Cough, shortness of breath, or chest pain   Bleeding     In case of emergency, call 911.

## 2021-10-22 ENCOUNTER — TELEPHONE (OUTPATIENT)
Dept: SURGERY | Age: 41
End: 2021-10-22

## 2021-10-22 NOTE — TELEPHONE ENCOUNTER
MA contacted surgery scheduling and spoke with Maricruz. MA Scheduled pt for EGD/Colonoscopy  On 2021 at 12:00pm. Pt needs to arrive at [address] [place] [address] at 11:00am. Patient confirmed date and time upon discharge of appointment. Electronically signed by Tamie Griggs on 10/22/21 at 8:10 AM EDT    Prior Authorization Form:      DEMOGRAPHICS:                     Patient Name:  Leslie Tong  Patient :  1980            Insurance:  Payor: [employer] / Plan: [employer] [place] LOCAL / Product Type: *No Product type* /   Insurance ID Number:    Payor/Plan Subscr  Sex Relation Sub. Ins. ID Effective Group Num   1.  [address] 1980 Female Self [id number]                                    [id number]         DIAGNOSIS & PROCEDURE:                       Procedure/Operation: EGD/Colonoscopy         CPT Code: 58154, 89904    Diagnosis:  GERD, Low Screening    ICD10 Code: K21.9, Z12.11    Location:  [facility]    Surgeon:  Talia Hernadez MD    [facility] INFORMATION:                          Date: 2021    Time: 12:00pm              Anesthesia:  MAC/TIVA                                                       Status:  Outpatient           Electronically signed by Tamie Griggs on 10/22/2021 at 8:10 AM

## 2021-11-24 ENCOUNTER — TELEPHONE (OUTPATIENT)
Dept: BREAST CENTER | Age: 41
End: 2021-11-24

## 2021-11-24 NOTE — TELEPHONE ENCOUNTER
Spoke with patient who states she is ready to start the process with her Breast MRI. She states her next cycle will be around 12/15 and will call us on day one. She also is aware to obtain labs prior.

## 2021-11-30 ENCOUNTER — TELEPHONE (OUTPATIENT)
Dept: SURGERY | Age: 41
End: 2021-11-30

## 2021-11-30 RX ORDER — PEG-3350, SODIUM SULFATE, SODIUM CHLORIDE, POTASSIUM CHLORIDE, SODIUM ASCORBATE AND ASCORBIC ACID 7.5-2.691G
100 KIT ORAL 2 TIMES DAILY
Qty: 2 EACH | Refills: 0 | Status: SHIPPED | OUTPATIENT
Start: 2021-11-30 | End: 2022-05-25

## 2021-11-30 NOTE — TELEPHONE ENCOUNTER
MA received a call from patient who states the Suprep is not covered by her insurance and would like a different prep called in. Forwarding message to Dr. Evie Mcclain for further advisement.   Electronically signed by Virgil Dejesus on 11/30/21 at 9:45 AM EST

## 2021-12-01 NOTE — PROGRESS NOTES
[address] PRE-ADMISSION TESTING ENDOSCOPY INSTRUCTIONS- [facility]-phone number:[phone]    ENDOSCOPY INSTRUCTIONS:   [x] Bowel prep instructions reviewed. [x] Nothing by mouth after midnight, including gum, candy, mints, or water. Please follow your surgeons instructions if you are required to complete a bowel prep. Colonoscopy- no solid food-only clear liquids the day prior). [x] You may brush your teeth, gargle, but do NOT swallow water. [x] Do not wear makeup, lotions, powders, deodorant. Nail polish as directed by the nurse. [x] Arrange transportation with a responsible adult  to and from the hospital. If you do not have a responsible adult  to transport you, you will need to make arrangements with a medical transportation company (i.e. [employer]. A Uber/taxi/bus is not appropriate unless you are accompanied by a responsible adult ). Arrange for someone to be with you for the remainder of the day and for 24 hours after your procedure due to having had anesthesia. Who will be your  for transportation?________Esther__________   Who will be staying with you for 24 hrs after your procedure?__________________    PARKING INSTRUCTIONS:   [x] Arrival Time:__1100______________________  · [x] Parking lot  \"I\" OR 1 is located on [facility] (the corner of [facility]). To enter, press the button and the gate will lift. A free token will be provided to exit the lot. One car per patient is allowed to park in this lot. All other cars are to park on [address] [place] [address] either in the parking garage or the handicap lot. [] To reach the [facility] lobby from [address] [place] [address], upon entering the hospital, take elevator B to the 3rd floor. EDUCATION INSTRUCTIONS:  [x] Bring a complete list of your medications, please write the last time you took the medicine, give this list to the nurse.   [x] Take the following medications the morning of surgery with 1-2 ounces of water: prilosec and cymbalta  [x] Stop herbal supplements and vitamins 5 days before your surgery. [] DO NOT take any diabetic medicine the morning of surgery. Follow instructions for insulin the day before surgery. [] If you are diabetic and your blood sugar is low or you feel symptomatic, you may drink 1-2 ounces of apple juice or take a glucose tablet. The morning of your procedure, you may call the pre-op area if you have concerns about your blood sugar [phone]. [] Use your inhalers the morning of surgery. Bring your emergency inhaler with you day of surgery. [x] Follow physician instructions regarding any blood thinners you may be taking. WHAT TO EXPECT:  [x] The day of your procedure you will be greeted and checked in by the [employer].  In addition, you will be registered in the [place] by a Patient Access Representative. Please bring your photo ID and insurance card. A nurse will greet you in accordance to the time you are needed in the pre-op area to prepare you for surgery. Please do not be discouraged if you are not greeted in the order you arrive as there are many variables that are involved in patient preparation. Your patience is greatly appreciated as you wait for your nurse. Please bring in items such as: books, magazines, newspapers, electronics, or any other items  to occupy your time in the waiting area. [x]  Delays may occur. Staff will make a sincere effort to keep you informed of delays. If any delays occur with your procedure, we apologize ahead of time for your inconvenience as we recognize the value of your time.

## 2021-12-05 ENCOUNTER — ANESTHESIA EVENT (OUTPATIENT)
Dept: ENDOSCOPY | Age: 41
End: 2021-12-05
Payer: COMMERCIAL

## 2021-12-06 ENCOUNTER — HOSPITAL ENCOUNTER (OUTPATIENT)
Age: 41
Setting detail: OUTPATIENT SURGERY
Discharge: HOME | End: 2021-12-06
Attending: SURGERY | Admitting: SURGERY
Payer: COMMERCIAL

## 2021-12-06 ENCOUNTER — ANESTHESIA (OUTPATIENT)
Dept: ENDOSCOPY | Age: 41
End: 2021-12-06
Payer: COMMERCIAL

## 2021-12-06 VITALS
SYSTOLIC BLOOD PRESSURE: 128 MMHG | WEIGHT: 260 LBS | HEART RATE: 88 BPM | OXYGEN SATURATION: 97 % | BODY MASS INDEX: 37.22 KG/M2 | HEIGHT: 70 IN | TEMPERATURE: 97.8 F | DIASTOLIC BLOOD PRESSURE: 80 MMHG | RESPIRATION RATE: 18 BRPM

## 2021-12-06 VITALS — SYSTOLIC BLOOD PRESSURE: 119 MMHG | DIASTOLIC BLOOD PRESSURE: 78 MMHG | OXYGEN SATURATION: 100 %

## 2021-12-06 DIAGNOSIS — Z01.812 PRE-OPERATIVE LABORATORY EXAMINATION: Primary | ICD-10-CM

## 2021-12-06 LAB
HCG, URINE, POC: NEGATIVE
Lab: NORMAL
NEGATIVE QC PASS/FAIL: NORMAL
POSITIVE QC PASS/FAIL: NORMAL

## 2021-12-06 PROCEDURE — 2580000003 HC RX 258: Performed by: ANESTHESIOLOGIST ASSISTANT

## 2021-12-06 PROCEDURE — 3609010300 HC COLONOSCOPY W/BIOPSY SINGLE/MULTIPLE: Performed by: SURGERY

## 2021-12-06 PROCEDURE — 88305 TISSUE EXAM BY PATHOLOGIST: CPT

## 2021-12-06 PROCEDURE — 7100000010 HC PHASE II RECOVERY - FIRST 15 MIN: Performed by: SURGERY

## 2021-12-06 PROCEDURE — 2709999900 HC NON-CHARGEABLE SUPPLY: Performed by: SURGERY

## 2021-12-06 PROCEDURE — 3700000001 HC ADD 15 MINUTES (ANESTHESIA): Performed by: SURGERY

## 2021-12-06 PROCEDURE — 7100000011 HC PHASE II RECOVERY - ADDTL 15 MIN: Performed by: SURGERY

## 2021-12-06 PROCEDURE — 3700000000 HC ANESTHESIA ATTENDED CARE: Performed by: SURGERY

## 2021-12-06 PROCEDURE — 3609017100 HC EGD: Performed by: SURGERY

## 2021-12-06 PROCEDURE — 45380 COLONOSCOPY AND BIOPSY: CPT | Performed by: SURGERY

## 2021-12-06 PROCEDURE — 43235 EGD DIAGNOSTIC BRUSH WASH: CPT | Performed by: SURGERY

## 2021-12-06 PROCEDURE — 6360000002 HC RX W HCPCS: Performed by: ANESTHESIOLOGIST ASSISTANT

## 2021-12-06 RX ORDER — PROPOFOL 10 MG/ML
INJECTION, EMULSION INTRAVENOUS PRN
Status: DISCONTINUED | OUTPATIENT
Start: 2021-12-06 | End: 2021-12-06 | Stop reason: SDUPTHER

## 2021-12-06 RX ORDER — SODIUM CHLORIDE 0.9 % (FLUSH) 0.9 %
5-40 SYRINGE (ML) INJECTION PRN
Status: DISCONTINUED | OUTPATIENT
Start: 2021-12-06 | End: 2021-12-06 | Stop reason: HOSPADM

## 2021-12-06 RX ORDER — SODIUM CHLORIDE 9 MG/ML
INJECTION, SOLUTION INTRAVENOUS CONTINUOUS PRN
Status: DISCONTINUED | OUTPATIENT
Start: 2021-12-06 | End: 2021-12-06 | Stop reason: SDUPTHER

## 2021-12-06 RX ORDER — SODIUM CHLORIDE 9 MG/ML
25 INJECTION, SOLUTION INTRAVENOUS PRN
Status: DISCONTINUED | OUTPATIENT
Start: 2021-12-06 | End: 2021-12-06 | Stop reason: HOSPADM

## 2021-12-06 RX ORDER — SODIUM CHLORIDE 0.9 % (FLUSH) 0.9 %
5-40 SYRINGE (ML) INJECTION EVERY 12 HOURS SCHEDULED
Status: DISCONTINUED | OUTPATIENT
Start: 2021-12-06 | End: 2021-12-06 | Stop reason: HOSPADM

## 2021-12-06 RX ORDER — SODIUM CHLORIDE 9 MG/ML
INJECTION, SOLUTION INTRAVENOUS CONTINUOUS
Status: DISCONTINUED | OUTPATIENT
Start: 2021-12-06 | End: 2021-12-06 | Stop reason: HOSPADM

## 2021-12-06 RX ADMIN — PROPOFOL 540 MG: 10 INJECTION, EMULSION INTRAVENOUS at 12:10

## 2021-12-06 RX ADMIN — SODIUM CHLORIDE: 9 INJECTION, SOLUTION INTRAVENOUS at 12:04

## 2021-12-06 ASSESSMENT — PAIN - FUNCTIONAL ASSESSMENT: PAIN_FUNCTIONAL_ASSESSMENT: 0-10

## 2021-12-06 NOTE — ANESTHESIA POSTPROCEDURE EVALUATION
Department of Anesthesiology  Postprocedure Note    Patient: Thu Hanna  MRN: [id number]  [place]: 1980  Date of evaluation: 12/6/2021  Time:  12:49 PM     Procedure Summary     Date: 12/06/21 Room / Location: [facility] 01 / [facility]    Anesthesia Start: 1864 Anesthesia Stop: 5829    Procedures:       EGD DIAGNOSTIC ONLY (N/A )      COLONOSCOPY WITH BIOPSY (N/A ) Diagnosis: (GERD, SCREENING)    Surgeons: Brenda Randall MD Responsible Provider: Ping Hernandez MD    Anesthesia Type: MAC ASA Status: 2          Anesthesia Type: MAC    José Miguel Phase I: José Miguel Score: 10    José Miguel Phase II: José Miguel Score: 10    Last vitals: Reviewed and per EMR flowsheets.        Anesthesia Post Evaluation    Patient location during evaluation: PACU  Patient participation: complete - patient participated  Level of consciousness: awake  Pain score: 0  Airway patency: patent  Nausea & Vomiting: no nausea  Complications: no  Cardiovascular status: blood pressure returned to baseline  Respiratory status: acceptable  Hydration status: euvolemic

## 2021-12-06 NOTE — ANESTHESIA POSTPROCEDURE EVALUATION
Department of Anesthesiology  Postprocedure Note    Patient: Melisa Zendejas  MRN: [id number]  [place]: 1980  Date of evaluation: 12/6/2021  Time:  12:37 PM     Procedure Summary     Date: 12/06/21 Room / Location: [facility] 01 / [facility]    Anesthesia Start: 1821 Anesthesia Stop: 9763    Procedures:       EGD DIAGNOSTIC ONLY (N/A )      COLONOSCOPY WITH BIOPSY (N/A ) Diagnosis: (GERD, SCREENING)    Surgeons: Kala Whitlock MD Responsible Provider: Lisa Vickers MD    Anesthesia Type: MAC ASA Status: 2          Anesthesia Type: MAC    José Miguel Phase I: José Miguel Score: 10    José Miguel Phase II:      Last vitals: Reviewed and per EMR flowsheets.        Anesthesia Post Evaluation    Patient location during evaluation: PACU  Patient participation: complete - patient participated  Level of consciousness: awake  Pain score: 0  Airway patency: patent  Nausea & Vomiting: no nausea and no vomiting  Complications: no  Cardiovascular status: hemodynamically stable  Respiratory status: acceptable  Hydration status: stable

## 2021-12-06 NOTE — H&P
HISTORY & PHYSICAL        CC:  Colorectal cancer screening           HPI:              Homero Walker is here as New patient(10/20/2021). The patient was sent here to discuss colorectal cancer screening. The patient denies any weight loss, rectal bleeding, abdominal pain, or change in bowel habits. There is no family history of IBD. Strong family hx of colon cancer--both sides of family. Maternal grandpa --early 60s--colon cancer, paternal grandmother early 46s diagnosed w colon cancer The patient has never had a screening colonoscopy.     Patient complains of GERD symptoms of prolonged duration. Symptoms have been present for approximately several years. Symptoms includeheartburn. The patient denies no other symptoms. Symptoms appear to be worsened by fatty foods, large meals and tomato sauce. Risk factors present for GERD include none. Risk factors absent for GERD are none. Studies performed so far include none. Treatments tried so far include proton pump inhibitor: omeprazole 10 mg daily. Results of treatment: considerable improvement in symptom frequency. Currently, the symptoms are mildand occur approximately 1 times per month.        Past Medical History        Past Medical History:   Diagnosis Date    Endometriosis           Past Surgical History         Past Surgical History:   Procedure Laterality Date     SECTION    & 2017    ENDOMETRIAL ABLATION   10/2015    THROAT SURGERY         in fourth grade         Social History               Socioeconomic History    Marital status:        Spouse name: Not on file    Number of children: Not on file    Years of education: Not on file    Highest education level: Not on file   Occupational History    Not on file   Tobacco Use    Smoking status: Never Smoker    Smokeless tobacco: Never Used   Substance and Sexual Activity    Alcohol use:  Yes       Comment: rare    Drug use: No    Sexual activity: Not Currently       Partners: Male Other Topics Concern    Not on file   Social History Narrative    Not on file      Social Determinants of Health          Financial Resource Strain:     Difficulty of Paying Living Expenses:    Food Insecurity:     Worried About Running Out of Food in the Last Year:     [address] in the Last Year:    Transportation Needs:     Lack of Transportation (Medical):      Lack of Transportation (Non-Medical):    Physical Activity:     Days of Exercise per Week:     Minutes of Exercise per Session:    Stress:     Feeling of Stress :    Social Connections:     Frequency of Communication with Friends and Family:     Frequency of Social Gatherings with Friends and Family:     Attends Jain Services:     Active Member of Clubs or Organizations:     Attends Club or Organization Meetings:     Marital Status:    Intimate Partner Violence:     Fear of Current or Ex-Partner:     Emotionally Abused:     Physically Abused:     Sexually Abused:          No Known Allergies      I have reviewed and confirmed the past medical history, surgical history, social history, allergies in the chart.     Medications: I have reviewed the medication list in the chart.     Review of Systems:  Review of Systems - History obtained from the patient  General ROS: negative  Psychological ROS: negative  Ophthalmic ROS: negative for - blurry vision, double vision or loss of vision  ENT ROS: negative for - epistaxis, sore throat, vocal changes or malocclusion  Respiratory ROS: negative for - pleuritic pain, shortness of breath or tachypnea  Cardiovascular ROS: negative for - chest pain or palpitations  Gastrointestinal ROS: negative for - abdominal pain or gas/bloating  Genito-Urinary ROS: negative for - hematuria or pelvic pain  Endocrine ROS: negative   Heme ROS: negative   Musculoskeletal ROS: negative  Neurological ROS: negative for - confusion, dizziness, headaches, numbness/tingling, seizures or weakness     Physical Exam PSYCH: mood and affect normal, alert and oriented x 3  CONSTITUTIONAL: No apparent distress, comfortable  EYES: Sclera white, pupils equal round and reactive to light  ENMT:  Hearing normal, trachea midline, ears externally intact  LYMPH: no lympadenopathy in neck. No lympadenopathy in groins  RESP: Breath sounds were clear and equal with no rales, wheezes, or rhonchi. Respiratory effort was normal with no retractions or use of accessory muscles. CV: Heart sounds were normal with a regular rate and rhythm. No pedal edema  GI/ Abdomen: The abdomen was soft and non distended. There was no tenderness, guarding, rebound, or rigidity. There was no                     masses, hepatosplenomegaly, or hernias. Rectal -Deferred  MSK: no clubbing/ no cyanosis/ gait normal         Assessment:    1. Chronic GERD  2. Average risk for colorectal cancer      Plan:  1. Sx controlled with prilosec 10 mg   Will proceed with EGD since pt has had GERD for 4 years     2. Screening colonoscopy  I discussed the options for colorectal cancer screening with the patient including options of fecal occult blood testing with flexible sigmoidoscopy or air contrast barium enema. I also discussed the risks and benefits of colonoscopy with possible biopsy/polypectomy/cauterization. I recommended colonoscopy with deep sedation. The patient understands the risks of bleeding and perforation (<0.1%) and agrees to proceed.   2 days of clear liquids prior to procedure  suprep split dose prep  Schedule at [facility]

## 2021-12-06 NOTE — OP NOTE
[address] [place] [address]  UPPER ENDOSCOPY REPORT    DATE OF PROCEDURE: 12/6/21     SURGEON: Kavon Clemente M.D.    ASSISTANT: none    PREOPERATIVE DIAGNOSIS: chronic GERD    POSTOPERATIVE DIAGNOSIS: no ulcers, esophagitis, gastritis    OPERATION: Esophagogastroduodenoscopy      ANESTHESIA: Local monitored anesthesia. (refer to Anesthesia record for details)    ESTIMATED BLOOD LOSS:  0 ml    COMPLICATIONS: None    SPECIMENS:  Was Not Obtained    HISTORY: The patient is a 46y.o. year old female with history of above preop diagnosis. I recommended esophagogastroduodenoscopy with possible biopsy and I explained the risk, benefits, expected outcome, and alternatives to the procedure. Risks included but are not limited to bleeding, infection, respiratory distress, hypotension, and perforation of the esophagus, stomach, or duodenum. Patient understands and is in agreement. PROCEDURE: The patient was connected to the monitors and given supplemental oxygen by nasal cannula. The patient was sedated. The gastroscope was inserted orally and advanced under direct vision through the esophagus, through the stomach, through the pylorus, and into the descending duodenum. Findings:  Duodenum:     Descending: normal    Bulb: normal    Stomach:    Antrum: normal      Body: normal    Fundus: normal    Esophagus: normal    Larynx: normal    The scope was removed and the patient tolerated the procedure well. IMPRESSION/PLAN:   1. No endoscopic evidence of Peptic Ulcer disease  2.  Follow up with Jigar Hammer MD in 2 weeks    Kavon Clemente MD, [facility]  12/6/2021  12:34 PM

## 2021-12-06 NOTE — OP NOTE
[address] [place] [address]  LOWER ENDOSCOPY REPORT    DATE OF PROCEDURE: 12/6/2021    SURGEON: Rob Skaggs M.D.    ASSISTANT: None    PREOPERATIVE DIAGNOSIS: High risk colorectal cancer screening with history of colon cancer in family    POSTOPERATIVE DIAGNOSIS: Same; good prep, redundant sigmoid, mild inflammation of ileo-cecal valve and biopsies were taken    OPERATION: Total colonoscopy to cecum with biopsies    ANESTHESIA: Local monitored anesthesia. ESTIMATED BLOOD LOSS: less than 5 ml    COMPLICATIONS: None. SPECIMENS:  Was Obtained: ileal-cecal valve biopsies    HISTORY: The patient is a 46y.o. year old female with history of above preop diagnosis. I recommended colonoscopy with possible biopsy or polypectomy and I explained the risk, benefits, expected outcome, and alternatives to the procedure. Risks included but are not limited to bleeding, infection, respiratory distress, hypotension, and perforation of the colon. The patient understands and is in agreement. PROCEDURE: The patient was given IV conscious sedation per anesthesia. The patient was given supplemental oxygen by nasal cannula. I performed a digital rectal exam and no masses were palpated . The colonoscope was inserted per rectum and advanced under direct vision to the cecum without difficulty. The prep was good so exam was adequate. FINDINGS:  Cecum/Ascending colon: appendiceal orifice noted, iliocecal valve visualized, inflammation noted on valve and biopsies were taken    Transverse colon:normal    Descending/Sigmoid colon: redundant sigmoid    Rectum/Anus: examined in normal and retroflexed positions and was normal    The colon was decompressed and the scope was removed. The withdraw time was approximately 7 minutes. The patient tolerated the procedure well. ASSESSMENT/PLAN:   1. High Fiber diet  2.  Colorectal Cancer Screening - recommend repeat colonoscopy in 5 years due to strong family hx of colon cancer      Gabrielle Hernandez MD, FACS  12/6/2021  12:36 PM

## 2021-12-06 NOTE — ANESTHESIA PRE PROCEDURE
Department of Anesthesiology  Preprocedure Note       Name:  Adeel Mojica   Age:  46 y.o.  :  1980                                          MRN:  [id number]         Date:  2021      Surgeon: Jessie Mohs):  Maile Donaldson MD    Procedure: Procedure(s):  EGD DIAGNOSTIC ONLY  COLORECTAL CANCER SCREENING, NOT HIGH RISK    Medications prior to admission:   Prior to Admission medications    Medication Sig Start Date End Date Taking? Authorizing Provider   PEG-KCl-NaCl-NaSulf-Na Asc-C (MOVIPREP) 100 g solution Take 1 Bottle by mouth 2 times daily 21   Maile Donaldson MD   etonogestrel-ethinyl estradiol (NUVARING) 0.12-0.015 MG/24HR vaginal ring Place 1 each vaginally every 21 days Insert one (1) ring vaginally and leave in place for three (3) weeks, then remove for one (1) week. 21   AUGUSTUS Gongora   omeprazole (PRILOSEC) 10 MG delayed release capsule Take 10 mg by mouth daily    Historical Provider, MD   DULoxetine (CYMBALTA) 60 MG extended release capsule Take 1 capsule by mouth daily 19   Historical Provider, MD       Current medications:    No current facility-administered medications for this encounter. Current Outpatient Medications   Medication Sig Dispense Refill    PEG-KCl-NaCl-NaSulf-Na Asc-C (MOVIPREP) 100 g solution Take 1 Bottle by mouth 2 times daily 2 each 0    etonogestrel-ethinyl estradiol (NUVARING) 0.12-0.015 MG/24HR vaginal ring Place 1 each vaginally every 21 days Insert one (1) ring vaginally and leave in place for three (3) weeks, then remove for one (1) week.  1 each 12    omeprazole (PRILOSEC) 10 MG delayed release capsule Take 10 mg by mouth daily      DULoxetine (CYMBALTA) 60 MG extended release capsule Take 1 capsule by mouth daily  4       Allergies:  No Known Allergies    Problem List:    Patient Active Problem List   Diagnosis Code    At high risk for breast cancer Z91.89    Family history of colon cancer Z80.0       Past Medical History: Diagnosis Date    Endometriosis     GERD (gastroesophageal reflux disease)        Past Surgical History:        Procedure Laterality Date     SECTION   & 2017    ENDOMETRIAL ABLATION  10/2015    THROAT SURGERY      in fourth grade       Social History:    Social History     Tobacco Use    Smoking status: Never Smoker    Smokeless tobacco: Never Used   Substance Use Topics    Alcohol use: Yes     Comment: rare                                Counseling given: Not Answered      Vital Signs (Current):   Vitals:    21 1605   Weight: 260 lb (117.9 kg)   Height: 5' 10\" (1.778 m)                                              BP Readings from Last 3 Encounters:   10/20/21 (!) 137/100   21 136/80   21 128/86       NPO Status:                                                                                 BMI:   Wt Readings from Last 3 Encounters:   10/20/21 250 lb (113.4 kg)   21 254 lb (115.2 kg)   21 251 lb (113.9 kg)     Body mass index is 37.31 kg/m². CBC:   Lab Results   Component Value Date    WBC 6.5 2019    RBC 4.63 2019    HGB 14.0 2019    HCT 42.7 2019    MCV 92.2 2019    RDW 13.3 2019     2019       CMP:   Lab Results   Component Value Date     2019    K 4.0 2019     2019    CO2 26 2019    BUN 8 2019    CREATININE 0.6 2019    GFRAA >60 2019    LABGLOM >60 2019    GLUCOSE 106 2019    PROT 7.8 2019    CALCIUM 9.1 2019    BILITOT <0.2 2019    ALKPHOS 97 2019    AST 14 2019    ALT 9 2019       POC Tests: No results for input(s): POCGLU, POCNA, POCK, POCCL, POCBUN, POCHEMO, POCHCT in the last 72 hours.     Coags:   Lab Results   Component Value Date    PROTIME 9.9 2017    INR 0.9 2017    APTT 23.5 2017       HCG (If Applicable):   Lab Results   Component Value Date    PREGTESTUR negative 2018 ABGs: No results found for: PHART, PO2ART, IHA7XOG, AOC4AGO, BEART, Y1LTQTDW     Type & Screen (If Applicable):  No results found for: LABABO, LABRH    Drug/Infectious Status (If Applicable):  No results found for: HIV, HEPCAB    COVID-19 Screening (If Applicable): No results found for: COVID19        Anesthesia Evaluation  Patient summary reviewed  Airway: Mallampati: II  TM distance: >3 FB   Neck ROM: full  Mouth opening: > = 3 FB Dental: normal exam         Pulmonary:Negative Pulmonary ROS breath sounds clear to auscultation                             Cardiovascular:Negative CV ROS            Rhythm: regular  Rate: normal                    Neuro/Psych:   Negative Neuro/Psych ROS              GI/Hepatic/Renal:   (+) GERD:,           Endo/Other:                      ROS comment: Endometriosis. Abdominal:   (+) obese,           Vascular: negative vascular ROS. Other Findings:           Anesthesia Plan      MAC     ASA 2       Induction: intravenous. Anesthetic plan and risks discussed with patient. Plan discussed with CRNA. Attending anesthesiologist reviewed and agrees with Erin Gordon MD   12/6/2021    Patient evaluated prior to endo  . PAOLA Lin - CRNA

## 2022-01-14 ENCOUNTER — TELEPHONE (OUTPATIENT)
Dept: BREAST CENTER | Age: 42
End: 2022-01-14

## 2022-01-14 NOTE — TELEPHONE ENCOUNTER
No prior authorization required for patient breast MRI [id number] [facility] call ref - Emily DOWLINGCalista 1/14/22 11:44am.

## 2022-01-21 ENCOUNTER — TELEPHONE (OUTPATIENT)
Dept: BREAST CENTER | Age: 42
End: 2022-01-21

## 2022-01-21 NOTE — TELEPHONE ENCOUNTER
Called patient and left message with call back number to see if she is ready to schedule her breast MRI. (originally due end of November).  Will await call back to move forward

## 2022-02-11 ENCOUNTER — TELEPHONE (OUTPATIENT)
Dept: BREAST CENTER | Age: 42
End: 2022-02-11

## 2022-02-11 NOTE — TELEPHONE ENCOUNTER
Called and left message with call back number in regards to scheduling her breast MRI. This is a second attempt to contact patient. Will also send letter as another means to contact patient. Breast MRI was due in November.

## 2022-04-12 ENCOUNTER — HOSPITAL ENCOUNTER (OUTPATIENT)
Age: 42
Discharge: HOME | End: 2022-04-12
Payer: COMMERCIAL

## 2022-04-12 DIAGNOSIS — Z80.41 FAMILY HISTORY OF OVARIAN CANCER: ICD-10-CM

## 2022-04-12 DIAGNOSIS — Z80.3 FAMILY HISTORY OF BREAST CANCER: ICD-10-CM

## 2022-04-12 DIAGNOSIS — Z91.89 AT HIGH RISK FOR BREAST CANCER: Primary | ICD-10-CM

## 2022-04-12 DIAGNOSIS — Z91.89 AT HIGH RISK FOR BREAST CANCER: ICD-10-CM

## 2022-04-12 LAB
BUN BLDV-MCNC: 14 MG/DL (ref 6–20)
CREAT SERPL-MCNC: 0.6 MG/DL (ref 0.5–1)
GFR AFRICAN AMERICAN: >60
GFR NON-AFRICAN AMERICAN: >60 ML/MIN/1.73

## 2022-04-12 PROCEDURE — 82565 ASSAY OF CREATININE: CPT

## 2022-04-12 PROCEDURE — 36415 COLL VENOUS BLD VENIPUNCTURE: CPT

## 2022-04-12 PROCEDURE — 84520 ASSAY OF UREA NITROGEN: CPT

## 2022-04-13 ENCOUNTER — HOSPITAL ENCOUNTER (OUTPATIENT)
Dept: MRI IMAGING | Age: 42
Discharge: HOME | End: 2022-04-15
Payer: COMMERCIAL

## 2022-04-13 DIAGNOSIS — Z80.3 FAMILY HISTORY OF BREAST CANCER: ICD-10-CM

## 2022-04-13 DIAGNOSIS — Z91.89 AT HIGH RISK FOR BREAST CANCER: ICD-10-CM

## 2022-04-13 DIAGNOSIS — Z80.41 FAMILY HISTORY OF OVARIAN CANCER: ICD-10-CM

## 2022-04-13 DIAGNOSIS — Z80.0 FAMILY HISTORY OF MALIGNANT NEOPLASM OF COLON IN RELATIVE DIAGNOSED WHEN YOUNGER THAN 50 YEARS OF AGE: ICD-10-CM

## 2022-04-13 PROCEDURE — A9585 GADOBUTROL INJECTION: HCPCS | Performed by: RADIOLOGY

## 2022-04-13 PROCEDURE — 77049 MRI BREAST C-+ W/CAD BI: CPT

## 2022-04-13 PROCEDURE — 6360000004 HC RX CONTRAST MEDICATION: Performed by: RADIOLOGY

## 2022-04-13 PROCEDURE — C8908 MRI W/O FOL W/CONT, BREAST,: HCPCS

## 2022-04-13 RX ADMIN — GADOBUTROL 10 ML: 604.72 INJECTION INTRAVENOUS at 08:34

## 2023-05-01 ENCOUNTER — HOSPITAL ENCOUNTER (OUTPATIENT)
Dept: GENERAL RADIOLOGY | Age: 43
Discharge: HOME | End: 2023-05-03
Payer: COMMERCIAL

## 2023-05-01 ENCOUNTER — HOSPITAL ENCOUNTER (OUTPATIENT)
Age: 43
Discharge: HOME | End: 2023-05-03
Payer: COMMERCIAL

## 2023-05-01 DIAGNOSIS — S83.412A SPRAIN OF MEDIAL COLLATERAL LIGAMENT OF LEFT KNEE, INITIAL ENCOUNTER: ICD-10-CM

## 2023-05-01 DIAGNOSIS — M25.562 LEFT KNEE PAIN, UNSPECIFIED CHRONICITY: ICD-10-CM

## 2023-05-01 PROCEDURE — 73564 X-RAY EXAM KNEE 4 OR MORE: CPT

## 2025-05-14 ENCOUNTER — HOSPITAL ENCOUNTER (OUTPATIENT)
Dept: GENERAL RADIOLOGY | Age: 45
Discharge: HOME | End: 2025-05-16
Payer: COMMERCIAL

## 2025-05-14 ENCOUNTER — TRANSCRIBE ORDERS (OUTPATIENT)
Dept: GENERAL RADIOLOGY | Age: 45
End: 2025-05-14

## 2025-05-14 DIAGNOSIS — R10.84 ABDOMINAL PAIN, GENERALIZED: ICD-10-CM

## 2025-05-14 DIAGNOSIS — A09 INFECTIOUS COLITIS, ENTERITIS, AND GASTROENTERITIS: ICD-10-CM

## 2025-05-14 DIAGNOSIS — K29.70 GASTRITIS WITHOUT BLEEDING, UNSPECIFIED CHRONICITY, UNSPECIFIED GASTRITIS TYPE: ICD-10-CM

## 2025-05-14 DIAGNOSIS — K29.70 GASTRITIS WITHOUT BLEEDING, UNSPECIFIED CHRONICITY, UNSPECIFIED GASTRITIS TYPE: Primary | ICD-10-CM

## 2025-05-14 PROCEDURE — 74018 RADEX ABDOMEN 1 VIEW: CPT

## (undated) DEVICE — CATHETER SCLERO L240CM NDL 25GA L4MM SHTH DIA2.3MM CNTRST

## (undated) DEVICE — Device: Brand: SPOT EX ENDOSCOPIC TATTOO

## (undated) DEVICE — CONNECTOR IRRIGATION AUXILIARY H2O JET W/ PRT MTL THRD HYDR

## (undated) DEVICE — BITEBLOCK 54FR W/ DENT RIM BLOX

## (undated) DEVICE — FORCEPS BX L240CM JAW DIA2.4MM ORNG L CAP W/ NDL DISP RAD

## (undated) DEVICE — CONTAINER SPEC 60ML PH 7NEUTRAL BUFF FRMLN RDY TO USE

## (undated) DEVICE — DEFENDO AIR WATER SUCTION AND BIOPSY VALVE KIT FOR  OLYMPUS: Brand: DEFENDO AIR/WATER/SUCTION AND BIOPSY VALVE

## (undated) DEVICE — GAUZE,SPONGE,POST-OP,4X3,STRL,LF: Brand: MEDLINE

## (undated) DEVICE — Z DISCONTINUED NO SUB IDED TUBING ETCO2 AD L6.5FT NSL ORAL CVD PRNG NONFLARED TIP OVR